# Patient Record
Sex: FEMALE | Race: WHITE | Employment: FULL TIME | URBAN - METROPOLITAN AREA
[De-identification: names, ages, dates, MRNs, and addresses within clinical notes are randomized per-mention and may not be internally consistent; named-entity substitution may affect disease eponyms.]

---

## 2017-02-24 ENCOUNTER — OFFICE VISIT (OUTPATIENT)
Dept: OBGYN CLINIC | Facility: CLINIC | Age: 52
End: 2017-02-24

## 2017-02-24 VITALS
WEIGHT: 154 LBS | SYSTOLIC BLOOD PRESSURE: 110 MMHG | BODY MASS INDEX: 24.75 KG/M2 | HEIGHT: 66 IN | DIASTOLIC BLOOD PRESSURE: 78 MMHG

## 2017-02-24 DIAGNOSIS — N89.8 VAGINAL DISCHARGE: ICD-10-CM

## 2017-02-24 DIAGNOSIS — Z12.31 VISIT FOR SCREENING MAMMOGRAM: ICD-10-CM

## 2017-02-24 DIAGNOSIS — Z01.419 WELL WOMAN EXAM WITH ROUTINE GYNECOLOGICAL EXAM: Primary | ICD-10-CM

## 2017-02-24 PROCEDURE — 99396 PREV VISIT EST AGE 40-64: CPT | Performed by: OBSTETRICS & GYNECOLOGY

## 2017-02-24 NOTE — PROGRESS NOTES
HPI:    Patient ID: Annette Baron is a 46year old female. HPI  Well woman visit. No problems. Review of Systems   Constitutional: Negative. Gastrointestinal: Negative. Genitourinary: Negative. Psychiatric/Behavioral: Negative.     All o PHYSICAL EXAM:    Physical Exam   Constitutional: She appears well-developed and well-nourished. HENT:   Head: Normocephalic. Eyes: Pupils are equal, round, and reactive to light. Neck: Normal range of motion. Neck supple. No thyromegaly present. exam.  Mammogram ordered. Recommend screening colonoscopy as advised by GI. Recommend dexascan for osteoporosis screening as indicated. Recommend regular exercise and quality diet. Return to clinic in one year or as needed.     Meds This Visit:  No pres

## 2017-02-26 LAB
GENITAL VAGINOSIS SCREEN: POSITIVE
TRICHOMONAS SCREEN: NEGATIVE

## 2017-02-27 ENCOUNTER — TELEPHONE (OUTPATIENT)
Dept: OBGYN CLINIC | Facility: CLINIC | Age: 52
End: 2017-02-27

## 2017-02-27 RX ORDER — METRONIDAZOLE 7.5 MG/G
1 GEL VAGINAL NIGHTLY
Qty: 70 G | Refills: 0 | Status: SHIPPED | OUTPATIENT
Start: 2017-02-27 | End: 2017-02-28

## 2017-02-27 NOTE — TELEPHONE ENCOUNTER
Generic metrogel is too expensive for pt ( over $100) so pt is requesting the oral medication to see if it is cheaper, please approve

## 2017-02-27 NOTE — TELEPHONE ENCOUNTER
----- Message from Dora Buckley MD sent at 2/27/2017  7:42 AM CST -----  Please inform patient that her vaginal culture was positive for bacterial vaginosis (BV). This is due to an overgrowth of vaginal bacteria.   It is readily treated with an antibio

## 2017-02-28 RX ORDER — METRONIDAZOLE 500 MG/1
500 TABLET ORAL 3 TIMES DAILY
Qty: 14 TABLET | Refills: 0 | Status: SHIPPED | OUTPATIENT
Start: 2017-02-28 | End: 2018-04-23 | Stop reason: ALTCHOICE

## 2017-03-22 ENCOUNTER — TELEPHONE (OUTPATIENT)
Dept: OBGYN CLINIC | Facility: CLINIC | Age: 52
End: 2017-03-22

## 2017-03-22 NOTE — TELEPHONE ENCOUNTER
Patient is calling for her daughters pap test results, was told her daughter needs to sign permission form in order for us to release any information, patient understands, she ll have her daughter come in and sign the consent

## 2017-03-22 NOTE — TELEPHONE ENCOUNTER
Pt nvr received her results in the mail or over the phone she was in 2/24 w Dr. Rhonda Sehti. Please call her. [phone] JENNIFER DAHL w results.

## 2017-04-15 ENCOUNTER — HOSPITAL ENCOUNTER (OUTPATIENT)
Dept: MAMMOGRAPHY | Age: 52
Discharge: HOME | End: 2017-04-15
Attending: OBSTETRICS & GYNECOLOGY
Payer: COMMERCIAL

## 2017-04-15 DIAGNOSIS — Z12.31 VISIT FOR SCREENING MAMMOGRAM: ICD-10-CM

## 2017-04-15 PROCEDURE — 77067 SCR MAMMO BI INCL CAD: CPT

## 2017-08-11 ENCOUNTER — OFFICE VISIT (OUTPATIENT)
Dept: OBGYN CLINIC | Facility: CLINIC | Age: 52
End: 2017-08-11

## 2017-08-11 VITALS
DIASTOLIC BLOOD PRESSURE: 91 MMHG | BODY MASS INDEX: 25 KG/M2 | SYSTOLIC BLOOD PRESSURE: 135 MMHG | HEART RATE: 81 BPM | WEIGHT: 153.63 LBS

## 2017-08-11 DIAGNOSIS — R10.30 LOWER ABDOMINAL PAIN: Primary | ICD-10-CM

## 2017-08-11 DIAGNOSIS — R10.2 PELVIC PAIN: ICD-10-CM

## 2017-08-11 PROCEDURE — 99213 OFFICE O/P EST LOW 20 MIN: CPT | Performed by: OBSTETRICS & GYNECOLOGY

## 2017-08-11 NOTE — PROGRESS NOTES
HPI:    Patient ID: Mohsen Zavaleta is a 45year old female. HPI  Problem visit  Presents with complaint of midline lower abdominal pain first felt approximately 2 months ago. Intermittently associated with a bloated feeling which subsided.   2 week

## 2017-08-26 ENCOUNTER — HOSPITAL ENCOUNTER (OUTPATIENT)
Dept: ULTRASOUND IMAGING | Age: 52
Discharge: HOME | End: 2017-08-26
Attending: OBSTETRICS & GYNECOLOGY
Payer: COMMERCIAL

## 2017-08-26 DIAGNOSIS — R10.2 PELVIC PAIN: ICD-10-CM

## 2017-08-26 PROCEDURE — 76856 US EXAM PELVIC COMPLETE: CPT | Performed by: OBSTETRICS & GYNECOLOGY

## 2017-08-26 PROCEDURE — 76830 TRANSVAGINAL US NON-OB: CPT | Performed by: OBSTETRICS & GYNECOLOGY

## 2017-08-29 ENCOUNTER — TELEPHONE (OUTPATIENT)
Dept: OBGYN CLINIC | Facility: CLINIC | Age: 52
End: 2017-08-29

## 2017-08-29 NOTE — TELEPHONE ENCOUNTER
Shailesh Still MD  [facility]             Please inform that pelvic ultrasound was completely normal.

## 2017-11-27 PROBLEM — R07.0 THROAT DISCOMFORT: Status: ACTIVE | Noted: 2017-11-27

## 2018-03-23 ENCOUNTER — OFFICE VISIT (OUTPATIENT)
Dept: OBGYN CLINIC | Facility: CLINIC | Age: 53
End: 2018-03-23

## 2018-03-23 VITALS — SYSTOLIC BLOOD PRESSURE: 118 MMHG | WEIGHT: 152 LBS | DIASTOLIC BLOOD PRESSURE: 84 MMHG | BODY MASS INDEX: 25 KG/M2

## 2018-03-23 DIAGNOSIS — Z01.419 WELL WOMAN EXAM WITH ROUTINE GYNECOLOGICAL EXAM: Primary | ICD-10-CM

## 2018-03-23 DIAGNOSIS — Z12.31 VISIT FOR SCREENING MAMMOGRAM: ICD-10-CM

## 2018-03-23 PROCEDURE — 99396 PREV VISIT EST AGE 40-64: CPT | Performed by: OBSTETRICS & GYNECOLOGY

## 2018-03-23 NOTE — PROGRESS NOTES
HPI:    Patient ID: Rollie Romberg is a 45year old female. HPI  Well woman  No c/o. Review of Systems   Constitutional: Negative. Cardiovascular: Negative. Gastrointestinal: Negative. Genitourinary: Negative. Skin: Negative.     Arturo Liu status: Former Smoker                                                              Packs/day: 0.00      Years: 0.00      Smokeless tobacco: Never Used                      Comment: quit 1998, now 10/d  Alcohol use: Yes           0.0 oz/week     Comment: 2/ meatus- without lesions, mass, or discharge. Urethra- normal without lesion, cyst, mass, or tenderness. Vulva- normal.  Labia majora and minora without lesions. Vagina- normal, no lesions or discharge. Moist and well supported. Bladder-  nontender.

## 2018-03-26 LAB — HPV I/H RISK 1 DNA SPEC QL NAA+PROBE: NEGATIVE

## 2018-03-27 LAB
LAST PAP RESULT: NORMAL
PAP HISTORY (OTHER THAN LAST PAP): NORMAL

## 2018-04-23 PROBLEM — Z86.010 PERSONAL HISTORY OF COLONIC POLYPS: Status: ACTIVE | Noted: 2018-04-23

## 2018-04-23 PROBLEM — R10.30 LOWER ABDOMINAL PAIN: Status: RESOLVED | Noted: 2017-08-11 | Resolved: 2018-04-23

## 2018-04-23 PROBLEM — R07.0 THROAT DISCOMFORT: Status: RESOLVED | Noted: 2017-11-27 | Resolved: 2018-04-23

## 2018-04-23 PROBLEM — Z86.0100 PERSONAL HISTORY OF COLONIC POLYPS: Status: ACTIVE | Noted: 2018-04-23

## 2018-04-23 PROBLEM — Z01.419 WELL WOMAN EXAM WITH ROUTINE GYNECOLOGICAL EXAM: Status: RESOLVED | Noted: 2017-02-24 | Resolved: 2018-04-23

## 2018-04-23 PROBLEM — R10.2 PELVIC PAIN: Status: RESOLVED | Noted: 2017-08-11 | Resolved: 2018-04-23

## 2019-05-11 ENCOUNTER — OFFICE VISIT (OUTPATIENT)
Dept: OBGYN CLINIC | Facility: CLINIC | Age: 54
End: 2019-05-11
Payer: COMMERCIAL

## 2019-05-11 VITALS
WEIGHT: 144 LBS | SYSTOLIC BLOOD PRESSURE: 122 MMHG | DIASTOLIC BLOOD PRESSURE: 79 MMHG | HEART RATE: 78 BPM | BODY MASS INDEX: 23 KG/M2

## 2019-05-11 DIAGNOSIS — Z01.419 WELL WOMAN EXAM WITH ROUTINE GYNECOLOGICAL EXAM: Primary | ICD-10-CM

## 2019-05-11 DIAGNOSIS — Z12.31 SCREENING MAMMOGRAM, ENCOUNTER FOR: ICD-10-CM

## 2019-05-11 DIAGNOSIS — N89.8 VAGINAL DISCHARGE: ICD-10-CM

## 2019-05-11 PROCEDURE — 99396 PREV VISIT EST AGE 40-64: CPT | Performed by: OBSTETRICS & GYNECOLOGY

## 2019-05-11 NOTE — PROGRESS NOTES
HPI:    Patient ID: Anurag Velez is a 52year old female. HPI  Well woman visit  No complaints. Her  suffered a serious cervical spinal injury and is in rehabilitation. Patient is taking care of his home affairs.   Review of Systems   Con Smokeless tobacco: Never Used      Tobacco comment: quit 1998, now 10/d    Alcohol use:  Yes      Alcohol/week: 0.0 oz      Comment: 2/night 4 days/week    Drug use: No       PHYSICAL EXAM:    Physical Exam  Vitals: /79   Pulse 78   Wt 144 lb (65.3 kg normal.  Labia majora and minora without lesions. Vagina- normal, no lesions or discharge. Moist and well supported. Bladder-  nontender. No masses. Normal support.   No evidence of cystocele,  abnormal bladder neck mobility or evident urinary inconti

## 2019-05-13 ENCOUNTER — TELEPHONE (OUTPATIENT)
Dept: OBGYN CLINIC | Facility: CLINIC | Age: 54
End: 2019-05-13

## 2019-05-13 NOTE — TELEPHONE ENCOUNTER
----- Message from Osvaldo Pope MD sent at 5/13/2019  9:03 AM CDT -----  Please inform patient that her vaginal culture was positive for bacterial vaginosis (BV). This is due to an overgrowth of vaginal bacteria.   It is readily treated with an antibio

## 2019-05-14 RX ORDER — METRONIDAZOLE 500 MG/1
500 TABLET ORAL 2 TIMES DAILY
Qty: 14 TABLET | Refills: 0 | Status: SHIPPED | OUTPATIENT
Start: 2019-05-14 | End: 2019-05-21

## 2019-05-14 NOTE — TELEPHONE ENCOUNTER
RN returned call to patient Advised of results and recommendations as noted below. Pt prefers Flagyl. RN reviews vaginitis precautions, ETOH avoidance for 10 days. Pt verbalizes understanding and agrees with plan.

## 2019-05-14 NOTE — TELEPHONE ENCOUNTER
Left a detailed message per pt request to inform of results and possible txs below per [facility]. Pt to call back and verify pharmacy as well as desired tx.

## 2019-08-27 ENCOUNTER — TELEPHONE (OUTPATIENT)
Dept: OBGYN CLINIC | Facility: CLINIC | Age: 54
End: 2019-08-27

## 2019-08-27 NOTE — TELEPHONE ENCOUNTER
PER PT REQUESTING TO KNOW WHEN WAS HER LAST MAMMO TEST / PT STATE IT'S OK TO LEAVE A DETAIL MESSAGE ON HER VOICE MAIL / PT STATE IF SHE NEED TO SCHEDULE AN APPT / SHE WOULD LIKE AN ORDER / PLS ADV

## 2019-08-27 NOTE — TELEPHONE ENCOUNTER
Upon chart review, pt was seen for annual 19 with [facility] and an order was placed for a mammogram to be completed. Pt's last mammo was last year 19. Called: Pt Name and  verified. Pt informed and verbalized understanding.

## 2019-09-07 ENCOUNTER — HOSPITAL ENCOUNTER (OUTPATIENT)
Dept: MAMMOGRAPHY | Age: 54
Discharge: HOME | End: 2019-09-07
Attending: OBSTETRICS & GYNECOLOGY
Payer: COMMERCIAL

## 2019-09-07 DIAGNOSIS — Z12.31 SCREENING MAMMOGRAM, ENCOUNTER FOR: ICD-10-CM

## 2019-09-07 PROCEDURE — 77063 BREAST TOMOSYNTHESIS BI: CPT | Performed by: OBSTETRICS & GYNECOLOGY

## 2019-09-07 PROCEDURE — 77067 SCR MAMMO BI INCL CAD: CPT | Performed by: OBSTETRICS & GYNECOLOGY

## 2019-11-25 PROBLEM — Z01.419 WELL WOMAN EXAM WITH ROUTINE GYNECOLOGICAL EXAM: Status: RESOLVED | Noted: 2017-02-24 | Resolved: 2019-11-25

## 2020-03-03 ENCOUNTER — TELEPHONE (OUTPATIENT)
Dept: OBGYN CLINIC | Facility: CLINIC | Age: 55
End: 2020-03-03

## 2020-03-03 NOTE — TELEPHONE ENCOUNTER
Pt requesting an apt with [facility] the soonest possible for vaginal infection, pt stated she has a new job and unable to come before 5:15 during the week.  Pt declined an apt with any other provider in office as was upset that she couldnt be offered a sooner apt

## 2020-03-07 ENCOUNTER — OFFICE VISIT (OUTPATIENT)
Dept: OBGYN CLINIC | Facility: CLINIC | Age: 55
End: 2020-03-07
Payer: COMMERCIAL

## 2020-03-07 VITALS
WEIGHT: 138.63 LBS | BODY MASS INDEX: 22.02 KG/M2 | SYSTOLIC BLOOD PRESSURE: 110 MMHG | DIASTOLIC BLOOD PRESSURE: 72 MMHG | HEIGHT: 66.54 IN

## 2020-03-07 DIAGNOSIS — N89.8 VAGINAL DISCHARGE: Primary | ICD-10-CM

## 2020-03-07 DIAGNOSIS — N94.9 VAGINAL BURNING: ICD-10-CM

## 2020-03-07 PROBLEM — N94.89 VAGINAL BURNING: Status: ACTIVE | Noted: 2020-03-07

## 2020-03-07 PROCEDURE — 99213 OFFICE O/P EST LOW 20 MIN: CPT | Performed by: OBSTETRICS & GYNECOLOGY

## 2020-03-07 RX ORDER — CLOTRIMAZOLE AND BETAMETHASONE DIPROPIONATE 10; .64 MG/G; MG/G
1 CREAM TOPICAL 2 TIMES DAILY
Qty: 45 G | Refills: 0 | Status: SHIPPED | OUTPATIENT
Start: 2020-03-07 | End: 2020-09-08

## 2020-03-07 NOTE — PROGRESS NOTES
HPI:    Patient ID: Jean Drummond is a 62year old year old female. HPI  GYN problem  Complains of a vaginal burning and whitish or clear discharge. Denies odor. Some focal tenderness externally. Used a cream that did not help. History of BV. daily.          Dispense:  45 g          Refill:  0      clotrimazole-betamethasone (LOTRISONE) 1-0.05 % External Cream, Apply 1 Application topically 2 (two) times daily. , Disp: 45 g, Rfl: 0  Levothyroxine Sodium 50 MCG Oral Tab, TAKE 1 TABLET BY MOUTH ON

## 2020-03-09 ENCOUNTER — TELEPHONE (OUTPATIENT)
Dept: OBGYN CLINIC | Facility: CLINIC | Age: 55
End: 2020-03-09

## 2020-03-09 LAB
GENITAL VAGINOSIS SCREEN: POSITIVE
TRICHOMONAS SCREEN: NEGATIVE

## 2020-03-09 RX ORDER — METRONIDAZOLE 7.5 MG/G
1 GEL VAGINAL NIGHTLY
Qty: 1 TUBE | Refills: 0 | Status: SHIPPED | OUTPATIENT
Start: 2020-03-09 | End: 2020-03-14

## 2020-03-09 NOTE — TELEPHONE ENCOUNTER
Pt called and informed of results and provider's recommendations below. Pt voices understanding. Pt preferring metrogel.  Order placed.     ----- Message from Rae Calle MD sent at 3/9/2020  9:52 AM CDT -----  Please inform patient that her vaginal c

## 2020-07-17 ENCOUNTER — TELEPHONE (OUTPATIENT)
Dept: OBGYN CLINIC | Facility: CLINIC | Age: 55
End: 2020-07-17

## 2020-07-17 NOTE — TELEPHONE ENCOUNTER
Chart reviewed and it appears that pt did not receive vaccine at our office. Pt called. Pt Name and  verified. Informed pt and pt states that she stepped on a nail while at home and hence the question in regards to her TDAP.  Pt states that she will go t

## 2020-09-08 ENCOUNTER — OFFICE VISIT (OUTPATIENT)
Dept: OBGYN CLINIC | Facility: CLINIC | Age: 55
End: 2020-09-08
Payer: COMMERCIAL

## 2020-09-08 VITALS
HEIGHT: 66 IN | SYSTOLIC BLOOD PRESSURE: 118 MMHG | DIASTOLIC BLOOD PRESSURE: 70 MMHG | BODY MASS INDEX: 20.41 KG/M2 | WEIGHT: 127 LBS

## 2020-09-08 DIAGNOSIS — D64.9 ANEMIA, UNSPECIFIED TYPE: ICD-10-CM

## 2020-09-08 DIAGNOSIS — Z12.31 ENCOUNTER FOR SCREENING MAMMOGRAM FOR BREAST CANCER: ICD-10-CM

## 2020-09-08 DIAGNOSIS — N89.8 VAGINAL DISCHARGE: ICD-10-CM

## 2020-09-08 DIAGNOSIS — E55.9 VITAMIN D DEFICIENCY: ICD-10-CM

## 2020-09-08 DIAGNOSIS — Z78.0 MENOPAUSE: ICD-10-CM

## 2020-09-08 DIAGNOSIS — Z01.419 ENCOUNTER FOR WELL WOMAN EXAM WITH ROUTINE GYNECOLOGICAL EXAM: Primary | ICD-10-CM

## 2020-09-08 PROCEDURE — 3008F BODY MASS INDEX DOCD: CPT | Performed by: OBSTETRICS & GYNECOLOGY

## 2020-09-08 PROCEDURE — 99396 PREV VISIT EST AGE 40-64: CPT | Performed by: OBSTETRICS & GYNECOLOGY

## 2020-09-08 PROCEDURE — 3074F SYST BP LT 130 MM HG: CPT | Performed by: OBSTETRICS & GYNECOLOGY

## 2020-09-08 PROCEDURE — 3078F DIAST BP <80 MM HG: CPT | Performed by: OBSTETRICS & GYNECOLOGY

## 2020-09-08 NOTE — PROGRESS NOTES
HPI:    Patient ID: Tim Maldonado is a 60year old year old female. HPI  Well woman visit  No complaints. Is interested in following up on her vitamin D deficiency. She has been taking 4000 units daily of vitamin D. Will order a level.   Is conc mass, or tenderness. Vulva- normal.  Labia majora and minora without lesions. Vagina- normal, no lesions or discharge. Moist and well supported. Bladder-  nontender. No masses. Normal support.   No evidence of cystocele,  abnormal bladder neck mobili 25-Hydroxy          Standing Status: Future          Standing Expiration Date: 9/8/2021      CBC W Differential W Platelet          Standing Status: Future          Standing Expiration Date: 9/8/2021      Cholecalciferol (VITAMIN D) 50 MCG (2000 UT) Oral T

## 2020-09-09 ENCOUNTER — TELEPHONE (OUTPATIENT)
Dept: OBGYN CLINIC | Facility: CLINIC | Age: 55
End: 2020-09-09

## 2020-09-09 NOTE — TELEPHONE ENCOUNTER
Pt called and informed of results and recommendations. Pt voices she always comes back positive for BV even though she doesn't have any symptoms.  Pt voices she is watching using harsh soaps, and would really like to not use medication if she is not having

## 2020-09-09 NOTE — TELEPHONE ENCOUNTER
That is fine if she is not bothered by it. Another option is to use OTC RepHresh vaginal gel which balances the pH of the vagina to reduce or eliminate BV sx or odors. Can be used weekly and prn.

## 2020-09-09 NOTE — TELEPHONE ENCOUNTER
----- Message from Ksenia Valdez MD sent at 9/9/2020 11:08 AM CDT -----  Please inform patient that her vaginal culture was positive for bacterial vaginosis (BV). This is due to an overgrowth of vaginal bacteria.   It is readily treated with an antibiot

## 2020-09-10 LAB
GENITAL VAGINOSIS SCREEN: POSITIVE
TRICHOMONAS SCREEN: NEGATIVE

## 2020-09-14 LAB — HPV I/H RISK 1 DNA SPEC QL NAA+PROBE: NEGATIVE

## 2020-10-03 ENCOUNTER — HOSPITAL ENCOUNTER (OUTPATIENT)
Dept: BONE DENSITY | Age: 55
Discharge: HOME | End: 2020-10-03
Attending: OBSTETRICS & GYNECOLOGY
Payer: COMMERCIAL

## 2020-10-03 ENCOUNTER — HOSPITAL ENCOUNTER (OUTPATIENT)
Dept: MAMMOGRAPHY | Age: 55
Discharge: HOME | End: 2020-10-03
Attending: OBSTETRICS & GYNECOLOGY
Payer: COMMERCIAL

## 2020-10-03 ENCOUNTER — LAB ENCOUNTER (OUTPATIENT)
Dept: LAB | Age: 55
End: 2020-10-03
Attending: OBSTETRICS & GYNECOLOGY
Payer: COMMERCIAL

## 2020-10-03 DIAGNOSIS — E55.9 VITAMIN D DEFICIENCY: ICD-10-CM

## 2020-10-03 DIAGNOSIS — Z78.0 MENOPAUSE: ICD-10-CM

## 2020-10-03 DIAGNOSIS — Z12.31 ENCOUNTER FOR SCREENING MAMMOGRAM FOR BREAST CANCER: ICD-10-CM

## 2020-10-03 DIAGNOSIS — D64.9 ANEMIA, UNSPECIFIED TYPE: ICD-10-CM

## 2020-10-03 PROCEDURE — 36415 COLL VENOUS BLD VENIPUNCTURE: CPT

## 2020-10-03 PROCEDURE — 77063 BREAST TOMOSYNTHESIS BI: CPT | Performed by: OBSTETRICS & GYNECOLOGY

## 2020-10-03 PROCEDURE — 77080 DXA BONE DENSITY AXIAL: CPT | Performed by: OBSTETRICS & GYNECOLOGY

## 2020-10-03 PROCEDURE — 85025 COMPLETE CBC W/AUTO DIFF WBC: CPT

## 2020-10-03 PROCEDURE — 77067 SCR MAMMO BI INCL CAD: CPT | Performed by: OBSTETRICS & GYNECOLOGY

## 2020-10-03 PROCEDURE — 82306 VITAMIN D 25 HYDROXY: CPT

## 2020-10-05 ENCOUNTER — TELEPHONE (OUTPATIENT)
Dept: OBGYN CLINIC | Facility: CLINIC | Age: 55
End: 2020-10-05

## 2020-10-06 ENCOUNTER — TELEPHONE (OUTPATIENT)
Dept: OBGYN CLINIC | Facility: CLINIC | Age: 55
End: 2020-10-06

## 2020-10-06 DIAGNOSIS — R92.2 DENSE BREAST TISSUE ON MAMMOGRAM: Primary | ICD-10-CM

## 2020-10-06 NOTE — TELEPHONE ENCOUNTER
----- Message from Carmine Kraft MD sent at 10/6/2020 12:31 PM CDT -----  Please inform that her mammogram is incomplete and needs additional views of right breast and right breast ultrasound.

## 2020-10-06 NOTE — TELEPHONE ENCOUNTER
Pt informed of normal Vitamin D level and Dexascan results and recommendations. pt voices understanding and scheduled a telephone visit on 10/13/20 with Dr. Douglas Wilburn to discuss Dexascan results.      Pt also made aware of mammogram results below-    Ben Ruff

## 2020-10-06 NOTE — TELEPHONE ENCOUNTER
LMTCB    THERE ARE 2 MESSAGES TO THIS PHONE ENCOUNTER-    1. -- Message from [facility] MD CORTEZ sent at 10/5/2020 12:15 PM CDT -----  Please inform by MyChart, mail, or call of normal laboratory tests-- Vitamin D.   Continue taking Vitamin D supplementatio

## 2020-10-08 ENCOUNTER — HOSPITAL ENCOUNTER (OUTPATIENT)
Dept: MAMMOGRAPHY | Facility: HOSPITAL | Age: 55
Discharge: HOME | End: 2020-10-08
Attending: OBSTETRICS & GYNECOLOGY
Payer: COMMERCIAL

## 2020-10-08 ENCOUNTER — HOSPITAL ENCOUNTER (OUTPATIENT)
Dept: ULTRASOUND IMAGING | Facility: HOSPITAL | Age: 55
Discharge: HOME | End: 2020-10-08
Attending: OBSTETRICS & GYNECOLOGY
Payer: COMMERCIAL

## 2020-10-08 DIAGNOSIS — R92.8 ABNORMAL MAMMOGRAM: ICD-10-CM

## 2020-10-08 PROCEDURE — 77061 BREAST TOMOSYNTHESIS UNI: CPT | Performed by: OBSTETRICS & GYNECOLOGY

## 2020-10-08 PROCEDURE — 76642 ULTRASOUND BREAST LIMITED: CPT | Performed by: OBSTETRICS & GYNECOLOGY

## 2020-10-08 PROCEDURE — 77065 DX MAMMO INCL CAD UNI: CPT | Performed by: OBSTETRICS & GYNECOLOGY

## 2020-10-08 NOTE — TELEPHONE ENCOUNTER
----- Message from Carlton Lemus MD sent at 10/8/2020  2:17 PM CDT -----  Please inform patient that her mammogram and ultrasound showed benign findings. There were no abnormalities seen. Radiologist recommends a six-month follow-up right ultrasound.

## 2020-10-12 ENCOUNTER — TELEPHONE (OUTPATIENT)
Dept: OBGYN CLINIC | Facility: CLINIC | Age: 55
End: 2020-10-12

## 2020-10-12 NOTE — TELEPHONE ENCOUNTER
Visit changed to video visit. Pt Name and  verified. Pt informed and voiced understanding. Sent instructions for video visit via [facility].

## 2020-10-13 ENCOUNTER — VIRTUAL PHONE E/M (OUTPATIENT)
Dept: OBGYN CLINIC | Facility: CLINIC | Age: 55
End: 2020-10-13

## 2020-10-13 DIAGNOSIS — M81.6 LOCALIZED OSTEOPOROSIS WITHOUT CURRENT PATHOLOGICAL FRACTURE: Primary | ICD-10-CM

## 2020-10-13 PROCEDURE — 99213 OFFICE O/P EST LOW 20 MIN: CPT | Performed by: OBSTETRICS & GYNECOLOGY

## 2020-10-13 RX ORDER — ALENDRONATE SODIUM 70 MG/1
70 TABLET ORAL WEEKLY
Qty: 12 TABLET | Refills: 3 | Status: SHIPPED | OUTPATIENT
Start: 2020-10-13 | End: 2022-01-10

## 2020-10-13 NOTE — PROGRESS NOTES
Virtual Telephone Check-In    Staci Bateman verbally consents to a Virtual/Telephone Check-In visit on 10/13/20. Patient has been referred to the [facility] website at [email or web address] to review the yearly Consent to Treat document.     Patient und

## 2021-01-08 ENCOUNTER — TELEPHONE (OUTPATIENT)
Dept: OBGYN CLINIC | Facility: CLINIC | Age: 56
End: 2021-01-08

## 2021-01-08 RX ORDER — METRONIDAZOLE 7.5 MG/G
1 GEL VAGINAL NIGHTLY
Qty: 70 G | Refills: 0 | Status: SHIPPED | OUTPATIENT
Start: 2021-01-08 | End: 2021-01-13

## 2021-01-08 NOTE — TELEPHONE ENCOUNTER
patient informed rx sent to pharmacy. Patient to call back if symptoms do not improve.  Verbalized understanding

## 2021-01-08 NOTE — TELEPHONE ENCOUNTER
Patient name and  verified. Patient is complaining of vaginal discharge, odor, and itching. Patient thinks she may have BV and would like a prescription sent to pharmacy.  Per patient prefers not come for an exam due to Covid and her  having under

## 2021-02-25 ENCOUNTER — TELEPHONE (OUTPATIENT)
Dept: OBGYN CLINIC | Facility: CLINIC | Age: 56
End: 2021-02-25

## 2021-03-10 ENCOUNTER — TELEPHONE (OUTPATIENT)
Dept: OBGYN CLINIC | Facility: CLINIC | Age: 56
End: 2021-03-10

## 2021-03-10 NOTE — TELEPHONE ENCOUNTER
Patient name and  verified. Patient informed of order for R breast ultrasound. Advised patient to contact central scheduling to verify if radiology dept can do breast ultrasounds. Patient verbalized understanding.

## 2021-03-10 NOTE — TELEPHONE ENCOUNTER
Pt was informed but still has questions on where she can have it done states she had issues when she went to [place].  Please advise

## 2021-03-10 NOTE — TELEPHONE ENCOUNTER
Pt states she's due for her 6 month f/u mammogram and needs order and also wondering at what location she can schedule that.  please advise

## 2021-04-13 ENCOUNTER — HOSPITAL ENCOUNTER (OUTPATIENT)
Dept: ULTRASOUND IMAGING | Facility: HOSPITAL | Age: 56
Discharge: HOME | End: 2021-04-13
Attending: OBSTETRICS & GYNECOLOGY
Payer: COMMERCIAL

## 2021-04-13 ENCOUNTER — TELEPHONE (OUTPATIENT)
Dept: OBGYN CLINIC | Facility: CLINIC | Age: 56
End: 2021-04-13

## 2021-04-13 DIAGNOSIS — R92.2 DENSE BREAST TISSUE ON MAMMOGRAM: ICD-10-CM

## 2021-04-13 PROCEDURE — 76642 ULTRASOUND BREAST LIMITED: CPT | Performed by: OBSTETRICS & GYNECOLOGY

## 2021-04-13 NOTE — TELEPHONE ENCOUNTER
LMTCB    ----- Message from Gustavo Walsh MD sent at 4/13/2021  4:25 PM CDT -----  Please inform that right breast ultrasound did not show areas of concern. Radiologist recommends f/u bilat mammo and right breast ultrasound in six months.     Place in r

## 2021-04-14 NOTE — TELEPHONE ENCOUNTER
Patient calling back and is concerned that she read the report on my chart and they told her there was a spot. She would fill better discussing with Dr. Ramana Rowan.      Please advise

## 2021-04-19 NOTE — TELEPHONE ENCOUNTER
Talked to [facility] from the Mammogram/ultrasound department regarding pt's concerns regarding her 4/13 ultrasound. Was given Dr. Macy Moreno extension and message left on dr. Macy Moreno voicemail regarding pt's concerns and for call back.

## 2021-04-19 NOTE — TELEPHONE ENCOUNTER
Alf Jaimes MD  You; [facility] 5 days ago     I spoke with pt. Jolanta Tidwell has questions for radiologist Dr. Anthony Smart re letter he sent.

## 2021-04-29 NOTE — TELEPHONE ENCOUNTER
Pt called and she voiced Dr. Charlotte Enrique did not call her back. Pt still has questions regarding Dr. Martha Gaspar terminology that he used for her 4/13 ultrasound of her right breast. Pt voices Dr. Jeniffer Gamble could not answer her questions.  Message sent to Dr. Gonsalo Gutierrez

## 2021-09-14 ENCOUNTER — TELEPHONE (OUTPATIENT)
Dept: OBGYN CLINIC | Facility: CLINIC | Age: 56
End: 2021-09-14

## 2021-09-14 DIAGNOSIS — R92.2 DENSE BREAST TISSUE ON MAMMOGRAM: Primary | ICD-10-CM

## 2021-09-14 NOTE — TELEPHONE ENCOUNTER
RECOMMENDATIONS:   SHORT TERM FOLLOW-UP DIAGNOSTIC MAMMOGRAM BILATERAL BREASTS IN 6 MONTHS.         SHORT TERM FOLLOW-UP ULTRASOUND RIGHT BREAST IN 6 MONTHS.

## 2021-10-14 ENCOUNTER — HOSPITAL ENCOUNTER (OUTPATIENT)
Dept: ULTRASOUND IMAGING | Facility: HOSPITAL | Age: 56
Discharge: HOME | End: 2021-10-14
Attending: OBSTETRICS & GYNECOLOGY
Payer: COMMERCIAL

## 2021-10-14 ENCOUNTER — HOSPITAL ENCOUNTER (OUTPATIENT)
Dept: MAMMOGRAPHY | Facility: HOSPITAL | Age: 56
Discharge: HOME | End: 2021-10-14
Attending: OBSTETRICS & GYNECOLOGY
Payer: COMMERCIAL

## 2021-10-14 ENCOUNTER — TELEPHONE (OUTPATIENT)
Dept: OBGYN CLINIC | Facility: CLINIC | Age: 56
End: 2021-10-14

## 2021-10-14 DIAGNOSIS — R92.2 DENSE BREAST TISSUE ON MAMMOGRAM: ICD-10-CM

## 2021-10-14 PROCEDURE — 77066 DX MAMMO INCL CAD BI: CPT | Performed by: OBSTETRICS & GYNECOLOGY

## 2021-10-14 PROCEDURE — 76642 ULTRASOUND BREAST LIMITED: CPT | Performed by: OBSTETRICS & GYNECOLOGY

## 2021-10-14 PROCEDURE — 77062 BREAST TOMOSYNTHESIS BI: CPT | Performed by: OBSTETRICS & GYNECOLOGY

## 2021-10-14 NOTE — TELEPHONE ENCOUNTER
----- Message from Ramesh Hernandez MD sent at 10/14/2021  4:15 PM CDT -----  Please inform patient of results of bilateral diagnostic mammogram and breast ultrasound. Findings did not show any suspicious findings.   Overall stability but with newer findin

## 2021-10-15 NOTE — TELEPHONE ENCOUNTER
Reviewed results with pt per [facility]. Pt verbalized understanding, all questions answered. Pt requested annual apt. Apt made. No further question at this time.

## 2021-11-13 ENCOUNTER — OFFICE VISIT (OUTPATIENT)
Dept: OBGYN CLINIC | Facility: CLINIC | Age: 56
End: 2021-11-13
Payer: COMMERCIAL

## 2021-11-13 VITALS
WEIGHT: 126.81 LBS | HEART RATE: 70 BPM | BODY MASS INDEX: 21 KG/M2 | SYSTOLIC BLOOD PRESSURE: 131 MMHG | DIASTOLIC BLOOD PRESSURE: 93 MMHG

## 2021-11-13 DIAGNOSIS — N60.11 FIBROCYSTIC CHANGES OF RIGHT BREAST: ICD-10-CM

## 2021-11-13 DIAGNOSIS — Z01.419 ENCOUNTER FOR WELL WOMAN EXAM WITH ROUTINE GYNECOLOGICAL EXAM: Primary | ICD-10-CM

## 2021-11-13 DIAGNOSIS — N63.0 FIBROUS BREAST LUMPS: ICD-10-CM

## 2021-11-13 PROBLEM — M81.0 AGE-RELATED OSTEOPOROSIS WITHOUT CURRENT PATHOLOGICAL FRACTURE: Status: ACTIVE | Noted: 2021-11-13

## 2021-11-13 PROBLEM — N94.89 VAGINAL BURNING: Status: RESOLVED | Noted: 2020-03-07 | Resolved: 2021-11-13

## 2021-11-13 PROBLEM — N89.8 VAGINAL DISCHARGE: Status: RESOLVED | Noted: 2019-05-11 | Resolved: 2021-11-13

## 2021-11-13 PROBLEM — N94.9 VAGINAL BURNING: Status: RESOLVED | Noted: 2020-03-07 | Resolved: 2021-11-13

## 2021-11-13 PROCEDURE — 3080F DIAST BP >= 90 MM HG: CPT | Performed by: OBSTETRICS & GYNECOLOGY

## 2021-11-13 PROCEDURE — 3075F SYST BP GE 130 - 139MM HG: CPT | Performed by: OBSTETRICS & GYNECOLOGY

## 2021-11-13 PROCEDURE — 99212 OFFICE O/P EST SF 10 MIN: CPT | Performed by: OBSTETRICS & GYNECOLOGY

## 2021-11-13 PROCEDURE — 99396 PREV VISIT EST AGE 40-64: CPT | Performed by: OBSTETRICS & GYNECOLOGY

## 2021-11-13 NOTE — PROGRESS NOTES
HPI:    Patient ID: Kalin Sinha is a 60year old year old female. HPI  Well woman visit  Menopausal female with osteoporosis on alendronate 70 mg p.o. weekly. No complaints. History of fibrocystic condition of the breasts.   Recent mammogram an with patient supine and chaperone present. External genitalia- normal.  Bartholin's and [place]'s glands normal.  Urethral meatus- without lesions, mass, or discharge. Urethra- normal without lesion, cyst, mass, or tenderness.   Vulva- normal.  Labia majora (FOSAMAX) 70 MG Oral Tab, Take 1 tablet (70 mg total) by mouth once a week.  Take with water in AM as directed., Disp: 12 tablet, Rfl: 3  Cholecalciferol (VITAMIN D) 50 MCG (2000 UT) Oral Tab, Take by mouth., Disp: , Rfl:     No facility-administered encoun

## 2021-12-01 ENCOUNTER — OFFICE VISIT (OUTPATIENT)
Dept: SURGERY | Facility: CLINIC | Age: 56
End: 2021-12-01
Payer: COMMERCIAL

## 2021-12-01 VITALS — BODY MASS INDEX: 19.77 KG/M2 | WEIGHT: 123 LBS | HEIGHT: 66 IN

## 2021-12-01 DIAGNOSIS — R92.8 ABNORMAL ULTRASOUND OF BREAST: ICD-10-CM

## 2021-12-01 DIAGNOSIS — N60.01 BREAST CYST, RIGHT: Primary | ICD-10-CM

## 2021-12-01 PROCEDURE — 3008F BODY MASS INDEX DOCD: CPT | Performed by: SURGERY

## 2021-12-01 PROCEDURE — 99244 OFF/OP CNSLTJ NEW/EST MOD 40: CPT | Performed by: SURGERY

## 2021-12-02 NOTE — PROGRESS NOTES
History and Physical      Micah Rodrigues is a 60year old female. HPI   Patient presents with:  Breast Problem: Pt referred by Dr. Nadia Pisano regarding dense breast tissue more to right then left. Pt states she does SBE.   Pt was going for 6 mos f/u d BREAKFAST 90 tablet 3   • alendronate (FOSAMAX) 70 MG Oral Tab Take 1 tablet (70 mg total) by mouth once a week. Take with water in AM as directed. 12 tablet 3   • Cholecalciferol (VITAMIN D) 50 MCG (2000 UT) Oral Tab Take by mouth.        ALLERGIES  No Kno no edema, cyanosis, or clubbing  Neurological: exam appropriate for age reflexes and motor skills appropriate for age   Breast: symmetric dense in UOQ B, nt, no skin change or nipple D/C, no LN, no discrete mass.   US survey shows dense glandular breast tis (primary encounter diagnosis)  Abnormal ultrasound of breast    60year old female with dense breast tissue clinically and radio graphically, perhaps changed over an interval due to weight loss and ongoing endogenous hormone changes, and also improved imag

## 2022-01-10 RX ORDER — ALENDRONATE SODIUM 70 MG/1
TABLET ORAL
Qty: 12 TABLET | Refills: 0 | Status: SHIPPED | OUTPATIENT
Start: 2022-01-10

## 2022-04-06 ENCOUNTER — TELEPHONE (OUTPATIENT)
Dept: OBGYN CLINIC | Facility: CLINIC | Age: 57
End: 2022-04-06

## 2022-04-06 NOTE — TELEPHONE ENCOUNTER
04/06/2022 Pt will be due for her 6 month follow up Right Breast ultrasound. Recall letter sent.        AMY Salinas

## 2022-04-08 ENCOUNTER — TELEPHONE (OUTPATIENT)
Dept: OBGYN CLINIC | Facility: CLINIC | Age: 57
End: 2022-04-08

## 2022-04-08 RX ORDER — ALENDRONATE SODIUM 70 MG/1
TABLET ORAL
Qty: 12 TABLET | Refills: 0 | Status: SHIPPED | OUTPATIENT
Start: 2022-04-08

## 2022-04-08 NOTE — TELEPHONE ENCOUNTER
Per pt has an order for breast US and mammogram and wondering if she needs both done or just the f/u US.  Please advise

## 2022-04-29 ENCOUNTER — HOSPITAL ENCOUNTER (OUTPATIENT)
Dept: ULTRASOUND IMAGING | Facility: HOSPITAL | Age: 57
Discharge: HOME | End: 2022-04-29
Attending: OBSTETRICS & GYNECOLOGY
Payer: COMMERCIAL

## 2022-04-29 DIAGNOSIS — N60.01 BREAST CYST, RIGHT: ICD-10-CM

## 2022-04-29 PROCEDURE — 76642 ULTRASOUND BREAST LIMITED: CPT | Performed by: OBSTETRICS & GYNECOLOGY

## 2022-05-02 ENCOUNTER — TELEPHONE (OUTPATIENT)
Dept: OBGYN CLINIC | Facility: CLINIC | Age: 57
End: 2022-05-02

## 2022-05-02 NOTE — TELEPHONE ENCOUNTER
[employer] message sent to pt.    ----- Message from Katy Caicedo MD sent at 5/2/2022 10:25 AM CDT -----  Please inform through [address], phone call, or other that her follow-up right breast ultrasound showed stable findings. The radiologist recommends follow-up diagnostic mammogram bilaterally and right breast ultrasound in 6 months. Placed in recall log.

## 2022-07-07 RX ORDER — ALENDRONATE SODIUM 70 MG/1
TABLET ORAL
Qty: 12 TABLET | Refills: 0 | Status: SHIPPED | OUTPATIENT
Start: 2022-07-07

## 2022-10-05 ENCOUNTER — TELEPHONE (OUTPATIENT)
Dept: PEDIATRICS CLINIC | Facility: CLINIC | Age: 57
End: 2022-10-05

## 2022-10-05 DIAGNOSIS — N60.01 BREAST CYST, RIGHT: Primary | ICD-10-CM

## 2022-10-26 ENCOUNTER — HOSPITAL ENCOUNTER (OUTPATIENT)
Dept: ULTRASOUND IMAGING | Facility: HOSPITAL | Age: 57
Discharge: HOME | End: 2022-10-26
Attending: OBSTETRICS & GYNECOLOGY
Payer: COMMERCIAL

## 2022-10-26 ENCOUNTER — HOSPITAL ENCOUNTER (OUTPATIENT)
Dept: MAMMOGRAPHY | Facility: HOSPITAL | Age: 57
Discharge: HOME | End: 2022-10-26
Attending: OBSTETRICS & GYNECOLOGY
Payer: COMMERCIAL

## 2022-10-26 DIAGNOSIS — N60.01 BREAST CYST, RIGHT: ICD-10-CM

## 2022-10-26 PROCEDURE — 77066 DX MAMMO INCL CAD BI: CPT | Performed by: OBSTETRICS & GYNECOLOGY

## 2022-10-26 PROCEDURE — 77062 BREAST TOMOSYNTHESIS BI: CPT | Performed by: OBSTETRICS & GYNECOLOGY

## 2022-10-26 PROCEDURE — 76642 ULTRASOUND BREAST LIMITED: CPT | Performed by: OBSTETRICS & GYNECOLOGY

## 2022-11-15 ENCOUNTER — OFFICE VISIT (OUTPATIENT)
Dept: OBGYN CLINIC | Facility: CLINIC | Age: 57
End: 2022-11-15
Payer: COMMERCIAL

## 2022-11-15 VITALS — SYSTOLIC BLOOD PRESSURE: 110 MMHG | DIASTOLIC BLOOD PRESSURE: 70 MMHG | BODY MASS INDEX: 19 KG/M2 | WEIGHT: 120.19 LBS

## 2022-11-15 DIAGNOSIS — Z01.419 ENCOUNTER FOR WELL WOMAN EXAM WITH ROUTINE GYNECOLOGICAL EXAM: Primary | ICD-10-CM

## 2022-11-15 DIAGNOSIS — N89.8 VAGINA ITCHING: ICD-10-CM

## 2022-11-15 PROCEDURE — 3074F SYST BP LT 130 MM HG: CPT | Performed by: OBSTETRICS & GYNECOLOGY

## 2022-11-15 PROCEDURE — 99396 PREV VISIT EST AGE 40-64: CPT | Performed by: OBSTETRICS & GYNECOLOGY

## 2022-11-15 PROCEDURE — 3078F DIAST BP <80 MM HG: CPT | Performed by: OBSTETRICS & GYNECOLOGY

## 2022-11-15 RX ORDER — ACYCLOVIR 400 MG/1
400 TABLET ORAL 3 TIMES DAILY
COMMUNITY
Start: 2022-09-19

## 2022-11-16 ENCOUNTER — TELEPHONE (OUTPATIENT)
Dept: OBGYN CLINIC | Facility: CLINIC | Age: 57
End: 2022-11-16

## 2022-11-16 RX ORDER — SECNIDAZOLE 2 G/4.8G
2 GRANULE ORAL ONCE
Qty: 1 EACH | Refills: 0 | Status: SHIPPED | OUTPATIENT
Start: 2022-11-16 | End: 2022-11-16

## 2022-11-16 NOTE — TELEPHONE ENCOUNTER
Pt called and informed of results and recommendations. Pt voices understanding.  Solesec ordered per pt's request.

## 2022-11-17 RX ORDER — METRONIDAZOLE 7.5 MG/G
1 GEL VAGINAL NIGHTLY
Qty: 1 EACH | Refills: 0 | Status: SHIPPED | OUTPATIENT
Start: 2022-11-17 | End: 2022-11-17 | Stop reason: ALTCHOICE

## 2022-11-18 LAB
GENITAL VAGINOSIS SCREEN: POSITIVE
TRICHOMONAS SCREEN: NEGATIVE

## 2022-11-22 ENCOUNTER — TELEPHONE (OUTPATIENT)
Dept: OBGYN CLINIC | Facility: CLINIC | Age: 57
End: 2022-11-22

## 2022-11-23 ENCOUNTER — TELEPHONE (OUTPATIENT)
Dept: OBGYN CLINIC | Facility: CLINIC | Age: 57
End: 2022-11-23

## 2022-11-23 DIAGNOSIS — Z01.419 ENCOUNTER FOR WELL WOMAN EXAM WITH ROUTINE GYNECOLOGICAL EXAM: Primary | ICD-10-CM

## 2022-11-23 LAB — HPV I/H RISK 1 DNA SPEC QL NAA+PROBE: NEGATIVE

## 2022-11-23 NOTE — TELEPHONE ENCOUNTER
----- Message from Merline Santos MD sent at 11/22/2022 11:49 AM CST -----  I do not see that an HPV COVID test was done for this patient. Please contact the lab and see if they can run it!

## 2022-11-28 ENCOUNTER — TELEPHONE (OUTPATIENT)
Dept: OBGYN CLINIC | Facility: CLINIC | Age: 57
End: 2022-11-28

## 2022-11-28 NOTE — TELEPHONE ENCOUNTER
MyChart message sent to pt.    ----- Message from Rogerio Olivier MD sent at 11/28/2022  1:08 PM CST -----  Please notify by MyChart or letter of normal Pap test and normal HPV cotesting.

## 2023-04-20 ENCOUNTER — OFFICE VISIT (OUTPATIENT)
Dept: OBGYN CLINIC | Facility: CLINIC | Age: 58
End: 2023-04-20

## 2023-04-20 VITALS — DIASTOLIC BLOOD PRESSURE: 78 MMHG | SYSTOLIC BLOOD PRESSURE: 116 MMHG

## 2023-04-20 DIAGNOSIS — R30.0 BURNING WITH URINATION: ICD-10-CM

## 2023-04-20 DIAGNOSIS — B37.31 VULVOVAGINITIS DUE TO YEAST: ICD-10-CM

## 2023-04-20 DIAGNOSIS — N89.8 VAGINAL DISCHARGE: Primary | ICD-10-CM

## 2023-04-20 LAB
APPEARANCE: CLEAR
BILIRUBIN: NEGATIVE
GLUCOSE (URINE DIPSTICK): NEGATIVE MG/DL
KETONES (URINE DIPSTICK): NEGATIVE MG/DL
MULTISTIX LOT#: ABNORMAL NUMERIC
NITRITE, URINE: NEGATIVE
OCCULT BLOOD: NEGATIVE
PH, URINE: 6.5 (ref 4.5–8)
PROTEIN (URINE DIPSTICK): NEGATIVE MG/DL
SPECIFIC GRAVITY: 1.01 (ref 1–1.03)
URINE-COLOR: YELLOW
UROBILINOGEN,SEMI-QN: 0.2 MG/DL (ref 0–1.9)

## 2023-04-20 PROCEDURE — 3074F SYST BP LT 130 MM HG: CPT | Performed by: OBSTETRICS & GYNECOLOGY

## 2023-04-20 PROCEDURE — 3078F DIAST BP <80 MM HG: CPT | Performed by: OBSTETRICS & GYNECOLOGY

## 2023-04-20 PROCEDURE — 81003 URINALYSIS AUTO W/O SCOPE: CPT | Performed by: OBSTETRICS & GYNECOLOGY

## 2023-04-20 PROCEDURE — 99214 OFFICE O/P EST MOD 30 MIN: CPT | Performed by: OBSTETRICS & GYNECOLOGY

## 2023-04-20 RX ORDER — FLUCONAZOLE 150 MG/1
150 TABLET ORAL ONCE
Qty: 2 TABLET | Refills: 0 | Status: SHIPPED | OUTPATIENT
Start: 2023-04-20 | End: 2023-04-20

## 2023-04-21 LAB
HSV1 DNA SPEC QL NAA+PROBE: NEGATIVE
HSV2 DNA SPEC QL NAA+PROBE: NEGATIVE

## 2023-04-22 LAB
GENITAL VAGINOSIS SCREEN: NEGATIVE
TRICHOMONAS SCREEN: NEGATIVE

## 2023-09-29 ENCOUNTER — TELEPHONE (OUTPATIENT)
Dept: OBGYN CLINIC | Facility: CLINIC | Age: 58
End: 2023-09-29

## 2023-09-29 DIAGNOSIS — N63.0 FIBROUS BREAST LUMPS: Primary | ICD-10-CM

## 2023-09-29 DIAGNOSIS — N60.11 FIBROCYSTIC CHANGES OF RIGHT BREAST: ICD-10-CM

## 2023-10-13 RX ORDER — ALENDRONATE SODIUM 70 MG/1
70 TABLET ORAL WEEKLY
Qty: 12 TABLET | Refills: 0 | Status: SHIPPED | OUTPATIENT
Start: 2023-10-13

## 2023-11-21 ENCOUNTER — OFFICE VISIT (OUTPATIENT)
Dept: OBGYN CLINIC | Facility: CLINIC | Age: 58
End: 2023-11-21
Payer: COMMERCIAL

## 2023-11-21 VITALS
DIASTOLIC BLOOD PRESSURE: 91 MMHG | BODY MASS INDEX: 19.13 KG/M2 | WEIGHT: 119 LBS | SYSTOLIC BLOOD PRESSURE: 150 MMHG | HEIGHT: 66 IN

## 2023-11-21 DIAGNOSIS — M80.00XD AGE-RELATED OSTEOPOROSIS WITH CURRENT PATHOLOGICAL FRACTURE WITH ROUTINE HEALING, SUBSEQUENT ENCOUNTER: ICD-10-CM

## 2023-11-21 DIAGNOSIS — Z01.419 ENCOUNTER FOR WELL WOMAN EXAM WITH ROUTINE GYNECOLOGICAL EXAM: Primary | ICD-10-CM

## 2023-11-21 PROCEDURE — 3080F DIAST BP >= 90 MM HG: CPT | Performed by: OBSTETRICS & GYNECOLOGY

## 2023-11-21 PROCEDURE — 3077F SYST BP >= 140 MM HG: CPT | Performed by: OBSTETRICS & GYNECOLOGY

## 2023-11-21 PROCEDURE — 99396 PREV VISIT EST AGE 40-64: CPT | Performed by: OBSTETRICS & GYNECOLOGY

## 2023-11-21 PROCEDURE — 3008F BODY MASS INDEX DOCD: CPT | Performed by: OBSTETRICS & GYNECOLOGY

## 2023-11-21 RX ORDER — ALENDRONATE SODIUM 70 MG/1
70 TABLET ORAL WEEKLY
Qty: 12 TABLET | Refills: 3 | Status: SHIPPED | OUTPATIENT
Start: 2023-11-21

## 2023-11-21 NOTE — PROGRESS NOTES
HPI:    Patient ID: Dominic Zacarias is a 59year old year old female. HPI  Well woman visit  24-year-old  1 para 1 menopausal female. History of osteoporosis on alendronate. DEXA scan was 3 years ago. We will recheck now for comparison. No c/o    Review of Systems   Constitutional: Negative. Cardiovascular: Negative. Gastrointestinal: Negative. Genitourinary: Negative. Skin: Negative. Neurological: Negative. Psychiatric/Behavioral: Negative. All other systems reviewed and are negative. Current Outpatient Medications   Medication Sig Dispense Refill    alendronate 70 MG Oral Tab Take 1 tablet (70 mg total) by mouth once a week. 12 tablet 0    acyclovir 400 MG Oral Tab Take 400 mg by mouth 3 (three) times daily. (Patient not taking: Reported on 11/15/2022)      Levothyroxine Sodium 50 MCG Oral Tab TAKE 1 TABLET BY MOUTH ONCE DAILY BEFORE BREAKFAST 90 tablet 3    Cholecalciferol (VITAMIN D) 50 MCG (2000 UT) Oral Tab Take by mouth. Physical Exam     Vitals: LMP  (LMP Unknown)   Neck: Supple and without masses. No cervical adenopathy. Breasts: Symmetric. Skin without lesions. No masses. Areolae are normal.  Nipples without discharge. No axillary or supra cervical adenopathy    Abdominal: Soft. Normal appearance and bowel sounds are normal. She exhibits no mass. There is no hepatosplenomegaly. There is no tenderness. There is no rebound and no CVA tenderness. No hernia. Hernia negative in the ventral area,  negative in the right inguinal area and negative in the left inguinal area. Genitourinary:   Pelvic exam was performed with patient supine and chaperone present. External genitalia- normal.  Bartholin's and [place]'s glands normal.  Urethral meatus- without lesions, mass, or discharge. Urethra- normal without lesion, cyst, mass, or tenderness. Vulva- normal.  Labia majora and minora without lesions. Vagina- normal, no lesions or discharge.   Moist and well supported. Bladder-  nontender. No masses. Normal support. No evidence of cystocele,  abnormal bladder neck mobility or evident urinary incontinence. Cervix- smooth, normal epithelium without lesions or discharge. No motion tenderness. Uterus- normal size, shape, and contour. Nontender. No masses. Adnexa-  Nontender, no masses. Perineum- normal without lesions  Anus-  Normal appearing without lesions. ASSESSMENT/PLAN:      ICD-10-CM    1. Encounter for well woman exam with routine gynecological exam  Z01.419 ThinPrep PAP Smear     Hpv Dna  High Risk , Thin Prep Collect      Normal examination. Maintain healthy lifestyle and habits with balanced diet of fruits, vegetables, nuts, fish, meat, and carbs. Limit fats and excess carbs. Exercise regularly; 30 minutes a day at least 5 days a week. Calcium and vitamin D intake or supplement as needed. Sunshine 20-30 minutes when able and avoid burning. Monthly self breast exams. Annual mammograms. Dexascans as indicated. Cervical cytology until age 72 for low risk patient. Return to clinic in 1-2 years. Osteoporosis hip- cont alendronate. Check dexa    Orders Placed This Encounter    Hpv Dna  High Risk , Thin Prep Collect     Standing Status:   Future     Standing Expiration Date:   11/21/2024     Order Specific Question:   HPV Genotyping Request (if HPV positive): Answer:   Yes [1]     Order Specific Question:   Release to patient     Answer:   Immediate       Outpatient Encounter Medications as of 11/21/2023   Medication Sig Dispense Refill    alendronate 70 MG Oral Tab Take 1 tablet (70 mg total) by mouth once a week. 12 tablet 0    acyclovir 400 MG Oral Tab Take 400 mg by mouth 3 (three) times daily. (Patient not taking: Reported on 11/15/2022)      Levothyroxine Sodium 50 MCG Oral Tab TAKE 1 TABLET BY MOUTH ONCE DAILY BEFORE BREAKFAST 90 tablet 3    Cholecalciferol (VITAMIN D) 50 MCG (2000 UT) Oral Tab Take by mouth.        No facility-administered encounter medications on file as of 11/21/2023.

## 2023-11-22 LAB — HPV I/H RISK 1 DNA SPEC QL NAA+PROBE: NEGATIVE

## 2024-01-11 ENCOUNTER — HOSPITAL ENCOUNTER (OUTPATIENT)
Dept: MAMMOGRAPHY | Facility: HOSPITAL | Age: 59
Discharge: HOME | End: 2024-01-11
Attending: OBSTETRICS & GYNECOLOGY
Payer: COMMERCIAL

## 2024-01-11 DIAGNOSIS — N63.0 FIBROUS BREAST LUMPS: ICD-10-CM

## 2024-01-11 DIAGNOSIS — N60.11 FIBROCYSTIC CHANGES OF RIGHT BREAST: ICD-10-CM

## 2024-01-11 DIAGNOSIS — Z12.31 VISIT FOR SCREENING MAMMOGRAM: ICD-10-CM

## 2024-01-11 PROCEDURE — 77067 SCR MAMMO BI INCL CAD: CPT | Performed by: OBSTETRICS & GYNECOLOGY

## 2024-01-11 PROCEDURE — 77063 BREAST TOMOSYNTHESIS BI: CPT | Performed by: OBSTETRICS & GYNECOLOGY

## 2024-02-03 ENCOUNTER — HOSPITAL ENCOUNTER (OUTPATIENT)
Dept: BONE DENSITY | Age: 59
Discharge: HOME | End: 2024-02-03
Attending: OBSTETRICS & GYNECOLOGY
Payer: COMMERCIAL

## 2024-02-03 DIAGNOSIS — M80.00XD AGE-RELATED OSTEOPOROSIS WITH CURRENT PATHOLOGICAL FRACTURE WITH ROUTINE HEALING, SUBSEQUENT ENCOUNTER: ICD-10-CM

## 2024-02-03 PROCEDURE — 77080 DXA BONE DENSITY AXIAL: CPT | Performed by: OBSTETRICS & GYNECOLOGY

## 2024-11-26 ENCOUNTER — OFFICE VISIT (OUTPATIENT)
Dept: OBGYN CLINIC | Facility: CLINIC | Age: 59
End: 2024-11-26
Payer: COMMERCIAL

## 2024-11-26 VITALS — BODY MASS INDEX: 19 KG/M2 | SYSTOLIC BLOOD PRESSURE: 132 MMHG | WEIGHT: 120.19 LBS | DIASTOLIC BLOOD PRESSURE: 82 MMHG

## 2024-11-26 DIAGNOSIS — Z12.31 ENCOUNTER FOR SCREENING MAMMOGRAM FOR BREAST CANCER: ICD-10-CM

## 2024-11-26 DIAGNOSIS — R39.9 UTI SYMPTOMS: ICD-10-CM

## 2024-11-26 DIAGNOSIS — Z01.419 ENCOUNTER FOR WELL WOMAN EXAM WITH ROUTINE GYNECOLOGICAL EXAM: Primary | ICD-10-CM

## 2024-11-26 LAB
APPEARANCE: CLEAR
BILIRUBIN: NEGATIVE
GLUCOSE (URINE DIPSTICK): NEGATIVE MG/DL
KETONES (URINE DIPSTICK): NEGATIVE MG/DL
MULTISTIX LOT#: ABNORMAL NUMERIC
NITRITE, URINE: NEGATIVE
PH, URINE: 5.5 (ref 4.5–8)
PROTEIN (URINE DIPSTICK): NEGATIVE MG/DL
SPECIFIC GRAVITY: 1.01 (ref 1–1.03)
URINE-COLOR: YELLOW
UROBILINOGEN,SEMI-QN: 0.2 MG/DL (ref 0–1.9)

## 2024-11-26 PROCEDURE — 3075F SYST BP GE 130 - 139MM HG: CPT | Performed by: OBSTETRICS & GYNECOLOGY

## 2024-11-26 PROCEDURE — 99396 PREV VISIT EST AGE 40-64: CPT | Performed by: OBSTETRICS & GYNECOLOGY

## 2024-11-26 PROCEDURE — 3079F DIAST BP 80-89 MM HG: CPT | Performed by: OBSTETRICS & GYNECOLOGY

## 2024-11-26 RX ORDER — ALENDRONATE SODIUM 70 MG/1
70 TABLET ORAL WEEKLY
Qty: 12 TABLET | Refills: 3 | Status: SHIPPED | OUTPATIENT
Start: 2024-11-26

## 2024-11-26 RX ORDER — SULFAMETHOXAZOLE AND TRIMETHOPRIM 800; 160 MG/1; MG/1
1 TABLET ORAL 2 TIMES DAILY
Qty: 6 TABLET | Refills: 1 | Status: SHIPPED | OUTPATIENT
Start: 2024-11-26 | End: 2024-11-29

## 2024-11-26 NOTE — PROGRESS NOTES
HPI:    Patient ID: Milagro Leal is a 59 year old year old female.    HPI  Well woman visit  59-year-old  1 para 1 menopausal female.  Has osteoporosis of the hip.  Treated with alendronate.  Vitamin D level was deficient in July with a level of 18.9.  Complains of dysuria and urinary frequency mildly.  Thinks the beginning of a UTI.  Urine dip shows trace leukocytes and blood.  Will treat empirically.  Push fluid and cranberry juice as well.  On alendronate for osteoporosis of the hip.  Will prescribe 1 more year to complete a 5-year timeframe.  Recommend daily vitamin D 2000 units/day.  Review of Systems   Constitutional: Negative.    Cardiovascular: Negative.    Gastrointestinal: Negative.    Genitourinary: Negative.    Skin: Negative.    Neurological: Negative.    Psychiatric/Behavioral: Negative.     All other systems reviewed and are negative.       Current Outpatient Medications   Medication Sig Dispense Refill    alendronate 70 MG Oral Tab Take 1 tablet (70 mg total) by mouth once a week. 12 tablet 3    Levothyroxine Sodium 50 MCG Oral Tab TAKE 1 TABLET BY MOUTH ONCE DAILY BEFORE BREAKFAST 90 tablet 3    Cholecalciferol (VITAMIN D) 50 MCG (2000 UT) Oral Tab Take by mouth. (Patient not taking: Reported on 2024)         Past Medical History:    Colon polyps    Hypothyroid     (normal spontaneous vaginal delivery) ([facility])    @ 32 weeks, twin girls       Past Surgical History:   Procedure Laterality Date    Bx/remv,lymph node,deep cerv Left     benign    Colonoscopy N/A 10/29/2018    Procedure: COLONOSCOPY, POSSIBLE BIOPSY, POSSIBLE POLYPECTOMY 12522;  Surgeon: Terrance Akers MD;  Location: [facility]    Colonoscopy,biopsy N/A 2015    Procedure: COLONOSCOPY, POSSIBLE BIOPSY, POSSIBLE POLYPECTOMY 52063;  Surgeon: Terrance Akers MD;  Location: [facility]    Colonoscopy,remv lesn,snare N/A 2015    Procedure: COLONOSCOPY, POSSIBLE BIOPSY,  POSSIBLE POLYPECTOMY 33894;  Surgeon: Terrance Akers MD;  Location: [facility]    Punc/aspir breast cyst Left 1995       Family History   Problem Relation Age of Onset    Hypertension Mother     Genito-Urinary Disorder Mother         per NG: fibroids/hysterectomy    Neurological Disorder Mother         per NG: parkinson's disease    Cancer Brother         lung    Heart Disease Father         per NG: CHF    High Blood Pressure Father     Breast Cancer Maternal Grandmother         85       Social History     Socioeconomic History    Marital status:      Spouse name: Not on file    Number of children: 2    Years of education: Not on file    Highest education level: Not on file   Occupational History    Occupation: Admin assist     Employer: [employer]   Tobacco Use    Smoking status: Some Days    Smokeless tobacco: Never    Tobacco comments:     quit 1998, now 10/d   Substance and Sexual Activity    Alcohol use: Yes     Alcohol/week: 0.0 standard drinks of alcohol     Comment: 2/night 3 days/week    Drug use: No    Sexual activity: Yes   Other Topics Concern     Service Not Asked    Blood Transfusions Not Asked    Caffeine Concern Yes     Comment: per NG: cofee, soda; 6 cups daily    Occupational Exposure Not Asked    Hobby Hazards Not Asked    Sleep Concern Not Asked    Stress Concern Not Asked    Weight Concern Not Asked    Special Diet Not Asked    Back Care Not Asked    Exercise Not Asked    Bike Helmet Not Asked    Seat Belt Not Asked    Self-Exams Not Asked   Social History Narrative     and remarried, 23 yo twin daughters.  Walking for exercise. 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      Social Drivers of Health     Financial Resource Strain: Not on file   Food Insecurity: Not on file   Transportation Needs: Not on file   Physical Activity: Not on file   Stress: Not on file   Social Connections: Not on file   Housing Stability: Not on file       Physical Exam      Vitals: /82   Wt 120 lb 3.2 oz (54.5 kg)   BMI 19.40 kg/m²     Constitutional: She appears well-developed and well-nourished.     Musculoskeletal: Normal range of motion of upper and lower extremities.   Neurological: She is alert and oriented x 3.   Skin: Skin is warm without pallor.  Psychiatric: Her behavior is normal. Judgment normal.  Able to communicate verbally.    HEENT:  EOMI.  VICENTE.  Sclera anicteric.    Head: Normocephalic.  Normal hair distribution.  No lesions.  Neck: Normal range of motion.      Adenopathy:  No supraclavicular or cervical adenopathy.  Thyroid:  Normal size, shape, and position.  No masses, tenderness, or nodules.  Cardiovascular: Normal rate and regular rhythm.    Pulmonary/Chest: Effort normal.   Abdominal: Soft. Normal appearance and bowel sounds are normal. She exhibits no mass. There is no hepatosplenomegaly. There is no tenderness. There is no rebound and no CVA tenderness. No hernia. Hernia negative in the ventral area,  negative in the right inguinal area and negative in the left inguinal area.   Lymphadenopathy:        Right: No inguinal adenopathy present.        Left: No inguinal adenopathy present.     Breasts:    Symmetric bilaterally.  Areolas without lesions.  Skin- normal without growths, lesions, erythema or peau d'orange change.  Nipples- without retraction or discharge.  No masses, lumps, skin changes, erythema, or lesions.  Axilla-  No adenopathy, mass, or tenderness.    Genitourinary:   Pelvic exam was performed with patient supine and chaperone present.  External genitalia- normal.  [place]'s and [place]'s glands normal.  Urethral meatus- without lesions, mass, or discharge.  Urethra- normal without lesion, cyst, mass, or tenderness.  Vulva- normal.  Labia majora and minora without lesions.   Vagina- normal, no lesions or discharge.  Moist and well supported.  Bladder-  nontender.  No masses.  Normal support.  No evidence of cystocele,  abnormal  bladder neck mobility or evident urinary incontinence.  Cervix- smooth, normal epithelium without lesions or discharge.  No motion tenderness.   Uterus- normal size, shape, and contour.  Nontender.  No masses.  Adnexa-  Nontender, no masses.   Perineum- normal without lesions  Anus-  Normal appearing without lesions.    ASSESSMENT/PLAN:      ICD-10-CM    1. Encounter for well woman exam with routine gynecological exam  Z01.419 ThinPrep PAP Smear     Hpv Dna  High Risk , Thin Prep Collect      2. Encounter for screening mammogram for breast cancer  Z12.31 [facility] KARINA 2D+3D SCREENING BILAT (CPT=77067/09031)      3.  UTI    Normal examination.  Maintain healthy lifestyle and habits with balanced diet of fruits, vegetables, nuts, fish, meat, and carbs.  Limit fats and excess carbs.  Exercise regularly; 30 minutes a day at least 5 days a week.  Calcium and vitamin D intake or supplement as needed.  Sunshine 20-30 minutes when able and avoid burning.  Monthly self breast exams.  Annual mammograms.  Dexascans from age 65 or as indicated.  Cervical cytology until age 65 for low risk patient.  Return to clinic in 1-2 years.     Orders Placed This Encounter    Hpv Dna  High Risk , Thin Prep Collect     Standing Status:   Future     Standing Expiration Date:   11/26/2025     Order Specific Question:   HPV Genotyping Request (if HPV positive):     Answer:   Yes [1]     Order Specific Question:   Release to patient     Answer:   Immediate       Encounter Medications[1]         [1]   Outpatient Encounter Medications as of 11/26/2024   Medication Sig Dispense Refill    alendronate 70 MG Oral Tab Take 1 tablet (70 mg total) by mouth once a week. 12 tablet 3    Levothyroxine Sodium 50 MCG Oral Tab TAKE 1 TABLET BY MOUTH ONCE DAILY BEFORE BREAKFAST 90 tablet 3    [DISCONTINUED] acyclovir 400 MG Oral Tab Take 400 mg by mouth 3 (three) times daily. (Patient not taking: Reported on 11/15/2022)      Cholecalciferol (VITAMIN D) 50 MCG (2000  UT) Oral Tab Take by mouth. (Patient not taking: Reported on 11/26/2024)       No facility-administered encounter medications on file as of 11/26/2024.

## 2024-11-27 LAB — HPV E6+E7 MRNA CVX QL NAA+PROBE: NEGATIVE

## 2025-02-01 ENCOUNTER — HOSPITAL ENCOUNTER (OUTPATIENT)
Dept: MAMMOGRAPHY | Facility: HOSPITAL | Age: 60
Discharge: HOME | End: 2025-02-01
Attending: OBSTETRICS & GYNECOLOGY
Payer: COMMERCIAL

## 2025-02-01 DIAGNOSIS — Z12.31 ENCOUNTER FOR SCREENING MAMMOGRAM FOR BREAST CANCER: ICD-10-CM

## 2025-02-01 PROCEDURE — 77067 SCR MAMMO BI INCL CAD: CPT | Performed by: OBSTETRICS & GYNECOLOGY

## 2025-02-01 PROCEDURE — 77063 BREAST TOMOSYNTHESIS BI: CPT | Performed by: OBSTETRICS & GYNECOLOGY

## (undated) NOTE — LETTER
3/28/2018              [address] [facility], [address]        [facility] [address]         Dear Rishabh Moreno,    It was a pleasure to see you. Your PAP test was normal.  There is no need for further testing at this time.   I look forward to seeing you

## (undated) NOTE — MR AVS SNAPSHOT
After Visit Summary   11/13/2021    Darby Bethea   MRN: [id number]           Visit Information     Date & Time  11/13/2021 10:20 AM Provider  Mary Kline MD [address] [place], [place] [address].  Phone  [phone] using your web-cam enabled computer or mobile device wherever you are. Video Visits cost $50 and can be paid hassle-free using a credit, debit, or health savings card. Not active on [employer]? Ask us how to get signed up today!         If you receive a surve

## (undated) NOTE — LETTER
9/15/2020              [address] [facility], [address]        [facility] [address]         Dear Delmis Keith,    It was a pleasure to see you. Your pap and Hpv cotesting was normal.  There is no need for further testing at this time.   I look forward t

## (undated) NOTE — LETTER
4/6/2022              [address] [facility],  [address]        [place] [address]         Dear Danya Rachel,    After your last mammogram, Dr. Radha Eddy MD recommended that you have a repeat mammogram  done in 6 months. The repeat mammogram is due in April 2022. Your healthcare needs are important to us. Please call central scheduling [phone] as soon as possible to schedule this appointment.     Sincerely,    Lori Aranda MD  [place] [address]  [phone]

## (undated) NOTE — MR AVS SNAPSHOT
[facility]  [address] [place] [address]  [phone]               Thank you for choosing us for your health care visit with Yuniel Sadler MD.  We are glad to serve you and happy to provide you with this summary of your visit. Allergies as of Feb 24, 2017     No Known Allergies                 Today's Vital Signs     BP Height Weight BMI Breastfeeding?       110/78 mmHg 5' 6\" (1.676 m) 154 lb (69.854 kg) 24.87 kg/m2 No          Current Medications          This list is accurate

## (undated) NOTE — LETTER
5/17/2019              [address] [facility], [address]        [facility]         Dear Krupa Brown,    It was a pleasure to see you. Your PAP test was normal.  There is no need for further testing at this time.   I look forward to seeing

## (undated) NOTE — MR AVS SNAPSHOT
After Visit Summary   9/8/2020    Tim Maldonado    MRN: [id number]           Visit Information     Date & Time  9/8/2020  2:20 PM Provider  Gabi Corado, [address] [facility], [address] [place] [address]  Dept.  Phone  [phone] XR DEXA BONE DENSITOMETRY (CPT=77080) [54439 CPT(R)]  9/8/2020 (Approximate) 9/8/2021      Follow-up Instructions    Return for Routine Gyne Visit.      Imaging Scheduling Instructions     Around September 8, 2020   Imaging:   [facility] KARINA 2D+3D SCREENING BILMARI Dearafael Otoole : Thank you for enrolling in [address] Please follow the instructions below to securely access your online medical record. [employer] allows you to send messages to your doctor, view test results, renew prescriptions and request appointments. If you receive a survey from [employer], please take a few minutes to complete it and provide feedback. We strive to deliver the best patient experience and are looking for ways to make improvements. Your feedback will help us do so.  For more information EMERGENCY ROOM Life-threatening emergencies needing immediate intervention at a hospital emergency room.  Average cost  $2,300*   *Cost varies based on your insurance coverage  For more information about hours, locations or appointment options available at

## (undated) NOTE — LETTER
Patient: Gabriela Rucker   YOB: 1965   Date of Visit: 12/1/2021     Dear  Dr. Randy Boogie MD,    Thank you for referring Gabriela Rucker to my practice. Please find my assessment and plan below.       Breast cyst, right  (primary encounter